# Patient Record
Sex: MALE | Race: WHITE | NOT HISPANIC OR LATINO | Employment: FULL TIME | URBAN - METROPOLITAN AREA
[De-identification: names, ages, dates, MRNs, and addresses within clinical notes are randomized per-mention and may not be internally consistent; named-entity substitution may affect disease eponyms.]

---

## 2024-01-09 ENCOUNTER — HOSPITAL ENCOUNTER (EMERGENCY)
Facility: CLINIC | Age: 34
Discharge: HOME | End: 2024-01-10
Attending: EMERGENCY MEDICINE | Admitting: EMERGENCY MEDICINE
Payer: COMMERCIAL

## 2024-01-09 DIAGNOSIS — R18.8 OTHER ASCITES: ICD-10-CM

## 2024-01-09 PROCEDURE — 82042 OTHER SOURCE ALBUMIN QUAN EA: CPT | Performed by: EMERGENCY MEDICINE

## 2024-01-09 PROCEDURE — 32555 ASPIRATE PLEURA W/ IMAGING: CPT

## 2024-01-09 PROCEDURE — 99285 EMERGENCY DEPT VISIT HI MDM: CPT | Mod: 25

## 2024-01-09 PROCEDURE — 84157 ASSAY OF PROTEIN OTHER: CPT | Performed by: EMERGENCY MEDICINE

## 2024-01-09 PROCEDURE — 87205 SMEAR GRAM STAIN: CPT | Performed by: EMERGENCY MEDICINE

## 2024-01-09 PROCEDURE — 89050 BODY FLUID CELL COUNT: CPT | Performed by: EMERGENCY MEDICINE

## 2024-01-09 ASSESSMENT — ACTIVITIES OF DAILY LIVING (ADL)
ADLS_ACUITY_SCORE: 35

## 2024-01-10 ENCOUNTER — TELEPHONE (OUTPATIENT)
Dept: EMERGENCY MEDICINE | Facility: CLINIC | Age: 34
End: 2024-01-10
Payer: COMMERCIAL

## 2024-01-10 VITALS
SYSTOLIC BLOOD PRESSURE: 145 MMHG | OXYGEN SATURATION: 99 % | TEMPERATURE: 98.7 F | HEART RATE: 86 BPM | DIASTOLIC BLOOD PRESSURE: 90 MMHG | RESPIRATION RATE: 18 BRPM

## 2024-01-10 DIAGNOSIS — R18.8 OTHER ASCITES: ICD-10-CM

## 2024-01-10 LAB
ABSOLUTE NEUTROPHILS, BODY FLUID: 12.8 /UL
ALBUMIN BODY FLUID SOURCE: NORMAL
ALBUMIN FLD-MCNC: 2.6 G/DL
APPEARANCE FLD: ABNORMAL
CELL COUNT BODY FLUID SOURCE: ABNORMAL
COLOR FLD: YELLOW
LYMPHOCYTES NFR FLD MANUAL: 33 %
MONOS+MACROS NFR FLD MANUAL: 60 %
NEUTS BAND NFR FLD MANUAL: 2 %
OTHER CELLS FLD MANUAL: 5 %
PROT FLD-MCNC: 5 G/DL
PROTEIN BODY FLUID SOURCE: NORMAL
WBC # FLD AUTO: 639 /UL

## 2024-01-10 ASSESSMENT — ACTIVITIES OF DAILY LIVING (ADL): ADLS_ACUITY_SCORE: 35

## 2024-01-10 NOTE — ED PROVIDER NOTES
I was called by IR.  Patient had called to have a paracentesis.  Ordered placed after reviewing chart with Ana Paula Kaufman MD  01/10/24 7105

## 2024-01-10 NOTE — ED TRIAGE NOTES
Patient was seen at [facility] room and sent to ED for paracentesis. Abdomen distended, but not painful. Reports being sober for 2 months.

## 2024-01-10 NOTE — ED PROVIDER NOTES
History     Chief Complaint:  Bloated       The history is provided by the patient.      Jonathan Newman is a 33 year old male presenting to the ED for abdominal discomfort and bloating. The patient reports trying to lose weight recently, and he has been experiencing discomfort and bloating in his abdomen for about a week and a half. He presented to the [facility] and was recommended to present to the ED for a paracentesis after CT of his abdomen and pelvis that revealed moderate to large ascites. He has been eating and drinking just fine. He also quit his daily alcohol use 2.5 months ago but denied experiencing alcohol withdrawal. He also denied any fever or chills. He has no history of medical problems, specifically liver problems. He does not have a primary care physician.     Independent Historian:   None - Patient Only    Review of External Notes:   [facility] Room visit today with CT abdomen and pelvis demonstrating moderate to large ascites and concern for hepatic fibrosis.      Medications:    The patient denies a past medical history.     Past Medical History:    Sleep apnea    Past Surgical History:    Biopsy of skin lesion  Hand surgery  Benld teeth extraction    Physical Exam   Patient Vitals for the past 24 hrs:   BP Temp Pulse Resp SpO2   01/09/24 1900 (!) 157/113 -- 101 -- 96 %   01/09/24 1852 -- -- -- -- 100 %   01/09/24 1850 (!) 162/118 -- 107 -- --   01/09/24 1831 (!) 186/125 98.7  F (37.1  C) (!) 121 18 100 %        Physical Exam  Constitutional: Well appearing.  HEENT: Atraumatic. Moist mucous membranes.  Neck: Soft.  Supple.  No JVD.  Cardiac: Regular rate and rhythm.  No murmur or rub.  Respiratory: Clear to auscultation bilaterally.  No respiratory distress.  No wheezing, rhonchi, or rales.  Abdomen: Soft and nontender.  No guarding.  Distended with ascites.  Musculoskeletal: No edema.  Normal range of motion.  Neurologic: Alert and oriented x3.  Normal tone and bulk.  Normal gait.  Skin:  No rashes.  No edema.  Psych: Normal affect.  Normal behavior.            Emergency Department Course   Laboratory:  Labs Ordered and Resulted from Time of ED Arrival to Time of ED Departure   ALBUMIN FLUID       Result Value    Albumin Fluid Source Abdomen      Albumin fluid 2.6     PROTEIN FLUID    Protein Fluid Source Abdomen      Protein Total Fluid 5.0          [facility]    -Paracentesis    Date/Time: 1/9/2024 10:00 PM    Performed by: Lux Barrientos MD  Authorized by: Lux Barrientos MD    Risks, benefits and alternatives discussed.      PRE-PROCEDURE DETAILS     Procedure purpose:  Diagnostic    ANESTHESIA (see MAR for exact dosages):     Anesthesia method:  Local infiltration    Local anesthetic:  Lidocaine 1% w/o epi    PROCEDURE DETAILS     Needle gauge:  18    Ultrasound guidance: yes      Puncture site:  R lower quadrant    Fluid removed amount:  1L    Fluid appearance:  Yellow    Dressing:  4x4 sterile gauze and adhesive bandage      PROCEDURE    Patient Tolerance:  Patient tolerated the procedure well with no immediate complications         Emergency Department Course & Assessments:         Interventions:  Medications - No data to display       Independent Interpretation (X-rays, CTs, rhythm strip):  None    Assessments/Consultations/Discussion of Management or Tests:  ED Course as of 01/09/24 2256 Tue Jan 09, 2024 1946 I obtained history and examined the patient as noted above.       Social Determinants of Health affecting care:   None    Disposition:  The patient was discharged to home.     Impression & Plan    Medical Decision Making:  Jonathan Newman is a 33-year-old man is afebrile and hemodynamically stable.  He has ascites new onset.  CT scan shows concern for hepatic fibrosis.  Liver enzymes noted as above at the urgency room.  He has no fever or abdominal tenderness to suggest SBP.  We discussed an ultrasound guided bedside paracentesis for diagnostic  purposes and he was in agreement.  I was able to take 1 L off.  Cell counts not concerning for SBP.  I contacted interventional radiology who can set the patient up for an outpatient paracentesis in the next few days and I gave him the number to call.  I spoke with GI who recommends discharge home and follow-up in the clinic.  Discussed this with the patient who is in agreement.  We stressed the need for obtaining a primary care physician and referral was placed.  We discussed supportive care at home and strict return precautions were given.  His questions were answered and he was in no distress at time of discharge.      Diagnosis:    ICD-10-CM    1. Other ascites  R18.8 Primary Care Referral     US Paracentesis with Albumin           Discharge Medications:  New Prescriptions    No medications on file      Scribe Disclosure:  I, Evelina Burgess, am serving as a scribe at 7:54 PM on 1/9/2024 to document services personally performed by Lux Barrientos MD based on my observations and the provider's statements to me.     I, Bethany Meeks, am serving as a scribe at 10:57 PM on 1/9/2024 to document services personally performed by Lux Barrientos MD based on my observations and the provider's statements to me.    1/9/2024   Lux Barrientos MD Salay, Nicholas J, MD  01/16/24 5131

## 2024-01-10 NOTE — DISCHARGE INSTRUCTIONS
Please call [phone] first thing in the morning to set up your outpatient paracentesis.  If you have trouble at night number please try [phone].  He can tell him that I spoke with the interventional radiologist Dr. Tran last night and he asked you to call this morning.

## 2024-01-15 LAB
BACTERIA FLD CULT: NO GROWTH
GRAM STAIN RESULT: NORMAL
GRAM STAIN RESULT: NORMAL

## 2024-01-16 ENCOUNTER — HOSPITAL ENCOUNTER (OUTPATIENT)
Dept: ULTRASOUND IMAGING | Facility: CLINIC | Age: 34
Discharge: HOME | End: 2024-01-16
Attending: EMERGENCY MEDICINE | Admitting: EMERGENCY MEDICINE
Payer: COMMERCIAL

## 2024-01-16 VITALS
HEART RATE: 98 BPM | RESPIRATION RATE: 18 BRPM | OXYGEN SATURATION: 100 % | SYSTOLIC BLOOD PRESSURE: 176 MMHG | DIASTOLIC BLOOD PRESSURE: 110 MMHG

## 2024-01-16 DIAGNOSIS — R18.8 OTHER ASCITES: ICD-10-CM

## 2024-01-16 PROCEDURE — 49083 ABD PARACENTESIS W/IMAGING: CPT

## 2024-01-16 PROCEDURE — 250N000011 HC RX IP 250 OP 636: Performed by: EMERGENCY MEDICINE

## 2024-01-16 PROCEDURE — P9047 ALBUMIN (HUMAN), 25%, 50ML: HCPCS | Performed by: EMERGENCY MEDICINE

## 2024-01-16 PROCEDURE — 250N000009 HC RX 250: Performed by: RADIOLOGY

## 2024-01-16 RX ORDER — ALBUMIN (HUMAN) 12.5 G/50ML
25-50 SOLUTION INTRAVENOUS ONCE
Status: COMPLETED | OUTPATIENT
Start: 2024-01-16 | End: 2024-01-16

## 2024-01-16 RX ORDER — LIDOCAINE HYDROCHLORIDE 10 MG/ML
1-30 INJECTION, SOLUTION EPIDURAL; INFILTRATION; INTRACAUDAL; PERINEURAL ONCE
Status: COMPLETED | OUTPATIENT
Start: 2024-01-16 | End: 2024-01-16

## 2024-01-16 RX ADMIN — LIDOCAINE HYDROCHLORIDE 10 ML: 10 INJECTION, SOLUTION EPIDURAL; INFILTRATION; INTRACAUDAL; PERINEURAL at 14:59

## 2024-01-16 RX ADMIN — ALBUMIN HUMAN 37.5 G: 0.25 SOLUTION INTRAVENOUS at 15:17

## 2024-01-16 NOTE — PROGRESS NOTES
Paracentesis performed by Dr. Palacios using image guidance. Pt tolerated the procedure well. 8,600 ml of clear/tomas fluid drained. 37.5g albumin given per standing orders. BP during procedure. Patient left the department in stable condition, encouraged patient to establish care with PCP.

## 2024-01-28 ENCOUNTER — HEALTH MAINTENANCE LETTER (OUTPATIENT)
Age: 34
End: 2024-01-28

## 2025-02-02 ENCOUNTER — HEALTH MAINTENANCE LETTER (OUTPATIENT)
Age: 35
End: 2025-02-02

## (undated) RX ORDER — ALBUMIN (HUMAN) 12.5 G/50ML
SOLUTION INTRAVENOUS
Status: DISPENSED
Start: 2024-01-16